# Patient Record
Sex: FEMALE | Race: WHITE | NOT HISPANIC OR LATINO | URBAN - METROPOLITAN AREA
[De-identification: names, ages, dates, MRNs, and addresses within clinical notes are randomized per-mention and may not be internally consistent; named-entity substitution may affect disease eponyms.]

---

## 2018-06-25 ENCOUNTER — OFFICE VISIT (OUTPATIENT)
Dept: URBAN - METROPOLITAN AREA CLINIC 18 | Facility: CLINIC | Age: 43
End: 2018-06-25
Payer: COMMERCIAL

## 2018-06-25 DIAGNOSIS — H52.13 MYOPIA, BILATERAL: Primary | ICD-10-CM

## 2018-06-25 PROCEDURE — 92015 DETERMINE REFRACTIVE STATE: CPT | Performed by: OPTOMETRIST

## 2018-06-25 PROCEDURE — 92014 COMPRE OPH EXAM EST PT 1/>: CPT | Performed by: OPTOMETRIST

## 2018-06-25 ASSESSMENT — VISUAL ACUITY
OD: 20/20
OS: 20/20

## 2018-06-25 NOTE — IMPRESSION/PLAN
Impression: Myopia, bilateral: H52.13. Plan: Discussed diagnosis in detail with patient. New SRx was given today.

## 2023-08-04 ENCOUNTER — TESTING ONLY (OUTPATIENT)
Dept: URBAN - METROPOLITAN AREA CLINIC 18 | Facility: CLINIC | Age: 48
End: 2023-08-04
Payer: COMMERCIAL

## 2023-08-04 DIAGNOSIS — D35.2 BENIGN TUMOR OF PITUITARY GLAND: Primary | ICD-10-CM

## 2023-08-04 DIAGNOSIS — H52.13 MYOPIA, BILATERAL: ICD-10-CM

## 2023-08-04 PROCEDURE — 92083 EXTENDED VISUAL FIELD XM: CPT | Performed by: OPTOMETRIST
